# Patient Record
Sex: MALE | Race: OTHER | NOT HISPANIC OR LATINO | URBAN - METROPOLITAN AREA
[De-identification: names, ages, dates, MRNs, and addresses within clinical notes are randomized per-mention and may not be internally consistent; named-entity substitution may affect disease eponyms.]

---

## 2017-03-29 ENCOUNTER — EMERGENCY (EMERGENCY)
Facility: HOSPITAL | Age: 22
LOS: 1 days | Discharge: HOME | End: 2017-03-29
Attending: PEDIATRICS | Admitting: PEDIATRICS
Payer: MEDICAID

## 2017-03-29 VITALS
SYSTOLIC BLOOD PRESSURE: 124 MMHG | RESPIRATION RATE: 16 BRPM | OXYGEN SATURATION: 98 % | WEIGHT: 139.99 LBS | DIASTOLIC BLOOD PRESSURE: 78 MMHG | TEMPERATURE: 98 F | HEART RATE: 81 BPM | HEIGHT: 64 IN

## 2017-03-29 VITALS
OXYGEN SATURATION: 100 % | DIASTOLIC BLOOD PRESSURE: 69 MMHG | SYSTOLIC BLOOD PRESSURE: 119 MMHG | HEART RATE: 71 BPM | RESPIRATION RATE: 16 BRPM

## 2017-03-29 DIAGNOSIS — R51 HEADACHE: ICD-10-CM

## 2017-03-29 DIAGNOSIS — H91.91 UNSPECIFIED HEARING LOSS, RIGHT EAR: ICD-10-CM

## 2017-03-29 DIAGNOSIS — H57.11 OCULAR PAIN, RIGHT EYE: ICD-10-CM

## 2017-03-29 DIAGNOSIS — R42 DIZZINESS AND GIDDINESS: ICD-10-CM

## 2017-03-29 LAB
ALBUMIN SERPL ELPH-MCNC: 4.5 G/DL — SIGNIFICANT CHANGE UP (ref 3.3–5)
ALP SERPL-CCNC: 87 U/L — SIGNIFICANT CHANGE UP (ref 40–120)
ALT FLD-CCNC: 29 U/L RC — SIGNIFICANT CHANGE UP (ref 10–45)
ANION GAP SERPL CALC-SCNC: 13 MMOL/L — SIGNIFICANT CHANGE UP (ref 5–17)
AST SERPL-CCNC: 29 U/L — SIGNIFICANT CHANGE UP (ref 10–40)
BASOPHILS # BLD AUTO: 0 K/UL — SIGNIFICANT CHANGE UP (ref 0–0.2)
BASOPHILS NFR BLD AUTO: 0.4 % — SIGNIFICANT CHANGE UP (ref 0–2)
BILIRUB SERPL-MCNC: 0.4 MG/DL — SIGNIFICANT CHANGE UP (ref 0.2–1.2)
BUN SERPL-MCNC: 14 MG/DL — SIGNIFICANT CHANGE UP (ref 7–23)
CALCIUM SERPL-MCNC: 9.5 MG/DL — SIGNIFICANT CHANGE UP (ref 8.4–10.5)
CHLORIDE SERPL-SCNC: 102 MMOL/L — SIGNIFICANT CHANGE UP (ref 96–108)
CO2 SERPL-SCNC: 24 MMOL/L — SIGNIFICANT CHANGE UP (ref 22–31)
CREAT SERPL-MCNC: 0.94 MG/DL — SIGNIFICANT CHANGE UP (ref 0.5–1.3)
EOSINOPHIL # BLD AUTO: 0.1 K/UL — SIGNIFICANT CHANGE UP (ref 0–0.5)
EOSINOPHIL NFR BLD AUTO: 0.7 % — SIGNIFICANT CHANGE UP (ref 0–6)
GLUCOSE SERPL-MCNC: 102 MG/DL — HIGH (ref 70–99)
HCT VFR BLD CALC: 45.2 % — SIGNIFICANT CHANGE UP (ref 39–50)
HGB BLD-MCNC: 15.5 G/DL — SIGNIFICANT CHANGE UP (ref 13–17)
LYMPHOCYTES # BLD AUTO: 1.4 K/UL — SIGNIFICANT CHANGE UP (ref 1–3.3)
LYMPHOCYTES # BLD AUTO: 14.8 % — SIGNIFICANT CHANGE UP (ref 13–44)
MCHC RBC-ENTMCNC: 29.1 PG — SIGNIFICANT CHANGE UP (ref 27–34)
MCHC RBC-ENTMCNC: 34.4 GM/DL — SIGNIFICANT CHANGE UP (ref 32–36)
MCV RBC AUTO: 84.6 FL — SIGNIFICANT CHANGE UP (ref 80–100)
MONOCYTES # BLD AUTO: 0.6 K/UL — SIGNIFICANT CHANGE UP (ref 0–0.9)
MONOCYTES NFR BLD AUTO: 6.1 % — SIGNIFICANT CHANGE UP (ref 2–14)
NEUTROPHILS # BLD AUTO: 7.4 K/UL — SIGNIFICANT CHANGE UP (ref 1.8–7.4)
NEUTROPHILS NFR BLD AUTO: 78.1 % — HIGH (ref 43–77)
PLATELET # BLD AUTO: 279 K/UL — SIGNIFICANT CHANGE UP (ref 150–400)
POTASSIUM SERPL-MCNC: 4.2 MMOL/L — SIGNIFICANT CHANGE UP (ref 3.5–5.3)
POTASSIUM SERPL-SCNC: 4.2 MMOL/L — SIGNIFICANT CHANGE UP (ref 3.5–5.3)
PROT SERPL-MCNC: 7.2 G/DL — SIGNIFICANT CHANGE UP (ref 6–8.3)
RBC # BLD: 5.34 M/UL — SIGNIFICANT CHANGE UP (ref 4.2–5.8)
RBC # FLD: 13.2 % — SIGNIFICANT CHANGE UP (ref 10.3–14.5)
SODIUM SERPL-SCNC: 139 MMOL/L — SIGNIFICANT CHANGE UP (ref 135–145)
WBC # BLD: 9.4 K/UL — SIGNIFICANT CHANGE UP (ref 3.8–10.5)
WBC # FLD AUTO: 9.4 K/UL — SIGNIFICANT CHANGE UP (ref 3.8–10.5)

## 2017-03-29 PROCEDURE — 96375 TX/PRO/DX INJ NEW DRUG ADDON: CPT

## 2017-03-29 PROCEDURE — 80053 COMPREHEN METABOLIC PANEL: CPT

## 2017-03-29 PROCEDURE — 70450 CT HEAD/BRAIN W/O DYE: CPT

## 2017-03-29 PROCEDURE — 70450 CT HEAD/BRAIN W/O DYE: CPT | Mod: 26

## 2017-03-29 PROCEDURE — 96374 THER/PROPH/DIAG INJ IV PUSH: CPT

## 2017-03-29 PROCEDURE — 99285 EMERGENCY DEPT VISIT HI MDM: CPT

## 2017-03-29 PROCEDURE — 85027 COMPLETE CBC AUTOMATED: CPT

## 2017-03-29 PROCEDURE — 99284 EMERGENCY DEPT VISIT MOD MDM: CPT | Mod: 25

## 2017-03-29 RX ORDER — METOCLOPRAMIDE HCL 10 MG
10 TABLET ORAL ONCE
Qty: 0 | Refills: 0 | Status: COMPLETED | OUTPATIENT
Start: 2017-03-29 | End: 2017-03-29

## 2017-03-29 RX ORDER — ACETAMINOPHEN 500 MG
1000 TABLET ORAL ONCE
Qty: 0 | Refills: 0 | Status: COMPLETED | OUTPATIENT
Start: 2017-03-29 | End: 2017-03-29

## 2017-03-29 RX ORDER — SODIUM CHLORIDE 9 MG/ML
1000 INJECTION INTRAMUSCULAR; INTRAVENOUS; SUBCUTANEOUS ONCE
Qty: 0 | Refills: 0 | Status: COMPLETED | OUTPATIENT
Start: 2017-03-29 | End: 2017-03-29

## 2017-03-29 RX ADMIN — Medication 400 MILLIGRAM(S): at 13:45

## 2017-03-29 RX ADMIN — Medication 10 MILLIGRAM(S): at 13:59

## 2017-03-29 RX ADMIN — SODIUM CHLORIDE 1000 MILLILITER(S): 9 INJECTION INTRAMUSCULAR; INTRAVENOUS; SUBCUTANEOUS at 13:45

## 2017-03-29 NOTE — ED PROVIDER NOTE - ATTENDING CONTRIBUTION TO CARE
21y M with no sign PMHx here with headache acute in onset at 1030a. Associated with transient blurred vision and retroorbital eye pain. Never had HA like this before. Nausea no vomiting. No recent fevers or illness. No neck pain. R sided decreased hearing. Unilateral.    Vital Signs Stable  Gen: well appearing, NAD  HEENT: no conjunctivitis, MMM  Neck supple  Cardiac: regular rate rhythm, normal S1S2  Chest: CTA BL, no wheeze or crackles  Abdomen: normal BS, soft, NT  Extremity: no gross deformity, good perfusion  Skin: no rash  Neuro: grossly normal CN II-XII intact though subjective decreased hearing in R ear, normal gait, normal finger to nose, PERRLA 4mm equally reactive  +sinus tenderness on R    AP 21y M with BLOCK, unilateral. Migraine vs SAH vs sinusitis. Tylenol, reglan, IVF, HCT. Within 6 hour window for HCT to eval SAH.

## 2017-03-29 NOTE — ED PROVIDER NOTE - MEDICAL DECISION MAKING DETAILS
Healthy 21yM presents with 3 hours nonexertional sudden onset R sided HA with associated R sided hearing loss and R eye pain. No h/o migraine. On exam, +R sided sinus tenderness with decreased hearing to finger-rub on R. No nuchal rigidity. Will obtain CT head, administer IVF, Tylenol, reglan and reassess. [facility] PGY3

## 2017-03-29 NOTE — ED ADULT NURSE NOTE - OBJECTIVE STATEMENT
17 y/o M primarily Albanian speaking c/o at 1030 sudden onset R sided headache, R ear pain, and vision changes/loss initially L eye and then R lasting one hour.  Pt presents with R sided headache and eye pressure.  PERRL at 4mm. +dizziness. No facial droop. No numbness or tingling.  Slight hearing deficit in R ear.  No chest pain.  No numbness or tingling.  +pulse, motor, sensory x4, equal bilat. No ataxia. Abd soft round nontender.  +nausea.  No vomiting or diarrhea.  Med lock 18 L AC placed.  Pt awaiting CT scan. Safety maintained.  Mother at bedside.  Will continue to monitor.

## 2017-03-29 NOTE — ED PROVIDER NOTE - OBJECTIVE STATEMENT
21yM Romansh-speaking presents with sudden-onset R sided HA while sitting this morning at 10:30am (3 hours prior to arrival). Associated with R eye pain and R sided hearing loss. Also with dizziness and blurry vision (initially out of L eye, then out of R). No trauma. HA initially 9/10 intensity, now 8/10. No pain medication prior to arrival. Denies neck pain/stiffness. No h/o headaches.    #[id number] Kayley

## 2017-03-29 NOTE — ED PROVIDER NOTE - PROGRESS NOTE DETAILS
reports improved HA after Tylenol, fluids, reglan. Rates HA now 4/10. Ct head pending.  [facility] PGY3 Ct head normal. Pain scale now 1/10 with improved R sided hearing. Will dc with neurology follow-up and strict return precautions.  [facility] PGY3

## 2019-05-31 NOTE — ED PROVIDER NOTE - CROS ED ENDOCRINE ALL NEG
IRINAI, seen for MTM today, no changes and doing well.Lila Carter, PharmDMedication Therapy Management [phone] - - -

## 2022-06-15 NOTE — ED PROVIDER NOTE - PRINCIPAL DIAGNOSIS
Sivan Hickman     Pump Instructions    Pump type  T Slim   Rapid acting insulin Novolog U100   BASAL RATE    Time U/Hr   0000 0.7   0300 0.5   0700 0.4   0800 0.4   1130 0.425   1430 0.475   1700 0.5   2000 0.65   INSULIN TO CARB RATIO   Time 1 u/ __ gm    0000 1 u/ 25 gm    0300 1 u/ 25 gm    0700 1 u/ 7 gm    0800 1 u/ 8 gm    1130  1 u/ 7 gm    1430 1 u/ 10 gm    1700 1 u/ 8 gm   2000 1 u/ 20 gm   CORRECTION FACTOR (CF)   Time  CF   0000 95   0300 95   0700 50   0800 50   1130 70   1430 50   1700 50   2000 90   TARGET BLOOD SUGAR   Time __ mg/dL   0000 120 mg/dL          Discharge Instructions    Communication:    Family to upload pump weekly at home for self-review and to maintain a record of most current pump settings.  Family to upload pump with trends of high or low BG levels for our review.  Family to contact diabetes team to make aware of pump upload.    Parents to upload pump as needed for urgent concerns, including trends of significant low or high blood sugar or other urgent concerns. Parents should communicate their concerns via telephone or portal when submitting data.     Downloads will be reviewed on Monday, Wednesday, and Friday afternoons. Any data submitted after 2 pm will be reviewed on the following review day, unless significant hypoglycemia is the concern. Please be aware that it may take up to 48 hours to receive a response if a download is submitted late in the afternoon.     Insulin Pump:    Family to upload pump at home for self-review and to maintain a record of most current pump settings.  If 2 readings > 300, family to change pump site and give a correction with a subcutaneous injection.  Please check your blood sugars at 2am for 3 nights, if your basal rates overnight have changed to monitor for hypoglycemia.    Monitor & Treatment:    Check blood glucose premeal, presnack, bedtime and as needed at 2 AM.  If on continuous blood glucose monitor check blood glucose once a day and if  blood glucose does not match symptoms or expectations.   Rotate insertion sites.  Dose insulin for all meals and snacks.  Goal is premeal dosing.   If your overnight basal rates have changed, please check your blood sugars at 2am for 3 nights to monitor for hypoglycemia.    Nutrition & Exercise:    Limit the amount of sugar sweetened drinks like soda pop and juice.  Family to continue to follow a meal plan and avoid grazing.  Recommend 30-60 minutes of physical activity 4-5 days a week.    Hypoglycemia & Hyperglycemia:     For Blood Glucose < 80 mg/dL, treat with 4 ounces of juice or 4 glucose tablets (15g).  Recheck blood glucose in 15 minutes.  Repeat until blood glucose is > 80.  Give correction doses of insulin for high blood sugars, as long as it has been more than 2 hrs since your last correction dose of insulin.  Check ketones if blood glucose > 300 mg/dL x2 and during time of illness. Please contact the [facility] ([phone]) if ketones are moderate or large.    Health Maintenance:    The Flu vaccine is recommended in patients with diabetes. Talk to your primary medical doctor.  The COVID vaccine is recommended in patients with diabetes per CDC guidelines if age criteria are met.   See an eye doctor, once a year.  Remember to wear a medical alert ID.    Follow-up & Forms:     Download pump via portal 2 days prior to your visit. Any data sent in within 2 weeks of an upcoming visit will be reviewed at the visit unless there are urgent concerns such as low blood sugars.   Routine prescriptions require 3 working days to process.  Prescriptions requiring prior authorization may take longer.  If requesting a refill after hours, a charge may be assessed.  School forms and diabetes camp forms require 2 weeks to complete. School forms will be provided once annually at visit preceding the start of the school year. It is the responsibility of the parent or guardian to communicate any further changes to insulin  regimen to the school throughout the year, and this may be done by providing a copy of the After Visit Summary from other visits to the school nurse.   Review of blood glucose logs and continuous glucose monitoring system data in between visits may have a charge assessed.    Schedule your next 2 appointments per your physician's recommendation before you leave today to ensure availability of date and time preferences.    Screening Labs Due: not due      Headache